# Patient Record
Sex: FEMALE | Race: BLACK OR AFRICAN AMERICAN | NOT HISPANIC OR LATINO | Employment: FULL TIME | URBAN - METROPOLITAN AREA
[De-identification: names, ages, dates, MRNs, and addresses within clinical notes are randomized per-mention and may not be internally consistent; named-entity substitution may affect disease eponyms.]

---

## 2018-08-24 LAB
HUMAN PAPILLOMAVIRUS (HPV): NORMAL
PAP RECOMMENDATION EXT: NORMAL

## 2019-11-25 PROBLEM — N83.209 OVARIAN CYST: Status: ACTIVE | Noted: 2019-11-25

## 2022-01-24 ENCOUNTER — PATIENT MESSAGE (OUTPATIENT)
Dept: ADMINISTRATIVE | Facility: HOSPITAL | Age: 37
End: 2022-01-24
Payer: MEDICAID

## 2022-04-04 ENCOUNTER — PATIENT MESSAGE (OUTPATIENT)
Dept: ADMINISTRATIVE | Facility: HOSPITAL | Age: 37
End: 2022-04-04
Payer: MEDICAID

## 2022-05-04 ENCOUNTER — PATIENT OUTREACH (OUTPATIENT)
Dept: ADMINISTRATIVE | Facility: HOSPITAL | Age: 37
End: 2022-05-04
Payer: MEDICAID

## 2023-04-04 ENCOUNTER — PATIENT MESSAGE (OUTPATIENT)
Dept: RESEARCH | Facility: HOSPITAL | Age: 38
End: 2023-04-04

## 2023-05-12 LAB
HPV APTIMA: NEGATIVE
PAP RECOMMENDATION EXT: NORMAL

## 2023-06-01 PROBLEM — O02.1 MISSED ABORTION: Status: ACTIVE | Noted: 2023-06-01

## 2023-06-05 PROBLEM — R10.31 RIGHT LOWER QUADRANT ABDOMINAL PAIN: Status: ACTIVE | Noted: 2023-06-05

## 2023-09-15 ENCOUNTER — PATIENT OUTREACH (OUTPATIENT)
Dept: ADMINISTRATIVE | Facility: HOSPITAL | Age: 38
End: 2023-09-15
Payer: MEDICAID

## 2024-03-14 PROBLEM — E78.2 MIXED HYPERCHOLESTEROLEMIA AND HYPERTRIGLYCERIDEMIA: Status: ACTIVE | Noted: 2024-03-14

## 2024-03-14 PROBLEM — E53.8 LOW SERUM VITAMIN B12: Status: ACTIVE | Noted: 2024-03-14

## 2024-03-14 PROBLEM — E55.9 VITAMIN D DEFICIENCY: Status: ACTIVE | Noted: 2024-03-14

## 2024-03-14 PROBLEM — R74.8 ELEVATED LIVER ENZYMES: Status: ACTIVE | Noted: 2024-03-14

## 2024-03-14 PROBLEM — E04.9 THYROID ENLARGEMENT: Status: ACTIVE | Noted: 2024-03-14

## 2024-03-14 PROBLEM — R40.0 DAYTIME SOMNOLENCE: Status: ACTIVE | Noted: 2024-03-14

## 2024-03-14 PROBLEM — R73.9 HYPERGLYCEMIA: Status: ACTIVE | Noted: 2024-03-14

## 2024-04-30 ENCOUNTER — PATIENT OUTREACH (OUTPATIENT)
Dept: ADMINISTRATIVE | Facility: OTHER | Age: 39
End: 2024-04-30
Payer: MEDICAID

## 2024-05-01 VITALS — DIASTOLIC BLOOD PRESSURE: 78 MMHG | SYSTOLIC BLOOD PRESSURE: 130 MMHG | HEART RATE: 76 BPM | OXYGEN SATURATION: 97 %

## 2024-05-06 ENCOUNTER — TELEPHONE (OUTPATIENT)
Dept: BARIATRICS | Facility: CLINIC | Age: 39
End: 2024-05-06
Payer: MEDICAID

## 2024-05-06 NOTE — TELEPHONE ENCOUNTER
----- Message from Rhonda Wick sent at 5/6/2024 12:28 PM CDT -----  Regarding: apt  Contact: [phone]  Pt calling in to schedule apt  referral is in system please call to discuss Further

## 2024-05-06 NOTE — TELEPHONE ENCOUNTER
Returned pt call in regard to interest in being scheduled for surgical wl. Pt confirmed she has another insurance besides medicaid ([employer]). Asked pt to update her insurance by calling the insurance verification dept. Pt will call back to be scheduled for a FC phone call after updating her insurance.

## 2024-05-21 ENCOUNTER — TELEPHONE (OUTPATIENT)
Dept: BARIATRICS | Facility: CLINIC | Age: 39
End: 2024-05-21
Payer: COMMERCIAL

## 2024-05-24 ENCOUNTER — TELEPHONE (OUTPATIENT)
Dept: BARIATRICS | Facility: CLINIC | Age: 39
End: 2024-05-24
Payer: COMMERCIAL

## 2025-01-20 DIAGNOSIS — Z36.89 ENCOUNTER FOR FETAL ANATOMIC SURVEY: Primary | ICD-10-CM

## 2025-01-30 ENCOUNTER — LAB VISIT (OUTPATIENT)
Dept: LAB | Facility: OTHER | Age: 40
End: 2025-01-30
Attending: OBSTETRICS & GYNECOLOGY
Payer: COMMERCIAL

## 2025-01-30 ENCOUNTER — OFFICE VISIT (OUTPATIENT)
Dept: MATERNAL FETAL MEDICINE | Facility: CLINIC | Age: 40
End: 2025-01-30
Payer: COMMERCIAL

## 2025-01-30 ENCOUNTER — PROCEDURE VISIT (OUTPATIENT)
Dept: MATERNAL FETAL MEDICINE | Facility: CLINIC | Age: 40
End: 2025-01-30
Payer: COMMERCIAL

## 2025-01-30 VITALS
DIASTOLIC BLOOD PRESSURE: 66 MMHG | BODY MASS INDEX: 40.84 KG/M2 | SYSTOLIC BLOOD PRESSURE: 119 MMHG | WEIGHT: 276.56 LBS

## 2025-01-30 DIAGNOSIS — O34.219 HISTORY OF CESAREAN DELIVERY, CURRENTLY PREGNANT: ICD-10-CM

## 2025-01-30 DIAGNOSIS — O26.22 RECURRENT PREGNANCY LOSS IN PREGNANT PATIENT IN SECOND TRIMESTER, ANTEPARTUM: ICD-10-CM

## 2025-01-30 DIAGNOSIS — F17.210 CIGARETTE NICOTINE DEPENDENCE WITHOUT COMPLICATION: ICD-10-CM

## 2025-01-30 DIAGNOSIS — O09.522 MULTIGRAVIDA OF ADVANCED MATERNAL AGE IN SECOND TRIMESTER: ICD-10-CM

## 2025-01-30 DIAGNOSIS — O26.22 RECURRENT PREGNANCY LOSS IN PREGNANT PATIENT IN SECOND TRIMESTER, ANTEPARTUM: Primary | ICD-10-CM

## 2025-01-30 DIAGNOSIS — Z36.89 ENCOUNTER FOR FETAL ANATOMIC SURVEY: ICD-10-CM

## 2025-01-30 PROCEDURE — 3074F SYST BP LT 130 MM HG: CPT | Mod: CPTII,S$GLB,, | Performed by: OBSTETRICS & GYNECOLOGY

## 2025-01-30 PROCEDURE — 86147 CARDIOLIPIN ANTIBODY EA IG: CPT | Mod: 59 | Performed by: OBSTETRICS & GYNECOLOGY

## 2025-01-30 PROCEDURE — 99204 OFFICE O/P NEW MOD 45 MIN: CPT | Mod: S$GLB,,, | Performed by: OBSTETRICS & GYNECOLOGY

## 2025-01-30 PROCEDURE — 76811 OB US DETAILED SNGL FETUS: CPT | Mod: S$GLB,,, | Performed by: OBSTETRICS & GYNECOLOGY

## 2025-01-30 PROCEDURE — 3078F DIAST BP <80 MM HG: CPT | Mod: CPTII,S$GLB,, | Performed by: OBSTETRICS & GYNECOLOGY

## 2025-01-30 PROCEDURE — 85613 RUSSELL VIPER VENOM DILUTED: CPT | Performed by: OBSTETRICS & GYNECOLOGY

## 2025-01-30 PROCEDURE — 3008F BODY MASS INDEX DOCD: CPT | Mod: CPTII,S$GLB,, | Performed by: OBSTETRICS & GYNECOLOGY

## 2025-01-30 PROCEDURE — 86146 BETA-2 GLYCOPROTEIN ANTIBODY: CPT | Performed by: OBSTETRICS & GYNECOLOGY

## 2025-01-30 PROCEDURE — 1159F MED LIST DOCD IN RCRD: CPT | Mod: CPTII,S$GLB,, | Performed by: OBSTETRICS & GYNECOLOGY

## 2025-01-30 PROCEDURE — 99999 PR PBB SHADOW E&M-EST. PATIENT-LVL III: CPT | Mod: PBBFAC,,, | Performed by: OBSTETRICS & GYNECOLOGY

## 2025-01-30 NOTE — PROGRESS NOTES
MATERNAL-FETAL MEDICINE   CONSULT NOTE    Provider requesting consultation: Sarah    SUBJECTIVE:     Ms. Jimena Vergara is a 39 y.o.  female with IUP at 24w2d who is seen in consultation by [facility] for evaluation and management of:  Problem   Recurrent Pregnancy Loss in Pregnant Patient in Second Trimester, Antepartum   Multigravida of Advanced Maternal Age in Second Trimester   History of  Delivery, Currently Pregnant   Cigarette Nicotine Dependence Without Complication     Patient has no complaints today. She is overall feeling well.  Patient denies any contractions/cramping, vaginal bleeding or leakage of fluid.  She reports good fetal movement.       Medication List with Changes/Refills   Current Medications    ASPIRIN (ECOTRIN) 81 MG EC TABLET    Take 81 mg by mouth once daily.    PRENATAL VIT 10-IRON FUM-FOLIC 65-1 MG TAB    Take 1 capsule by mouth Daily.     Review of patient's allergies indicates:  No Known Allergies    PMH:  Past Medical History:   Diagnosis Date    Digestive disorder     gerd    GERD (gastroesophageal reflux disease)      PObHx:  OB History    Para Term  AB Living   9 3 3   5 3   SAB IAB Ectopic Multiple Live Births   5       3      # Outcome Date GA Lbr Joe/2nd Weight Sex Type Anes PTL Lv   9 Current            8 SAB 2024 10w0d             Complications: History of dilation and curettage   7 SAB 2020 5w0d          6 2019 10w0d             Complications: History of dilation and curettage   5 Term 12/19/15 37w0d   M    BLAIR      Complications: Gestational hypertension   4 Term 14 40w0d   F   N BLAIR      Complications: Breech presentation   3 2012 7w0d          2 Term 10/14/10 37w0d   F CS-LTranv  N BLAIR      Complications: Breech presentation, Gestational hypertension   1 2008 13w0d             Complications: History of dilation and curettage     PSH:  Past Surgical History:   Procedure Laterality Date    APPENDECTOMY       SECTION       DILATION AND CURETTAGE OF UTERUS      DILATION AND CURETTAGE OF UTERUS USING SUCTION N/A 2023    Procedure: DILATION AND CURETTAGE, UTERUS, USING SUCTION;  Surgeon: Kerline Barger MD;  Location: [facility] OR;  Service: OB/GYN;  Laterality: N/A;    OOPHORECTOMY      Right oophorectomy       Family history:family history includes Heart disease in her father and mother; Hypertension in her father and mother.    Social history: reports that she has been smoking cigarettes. She started smoking about 21 years ago. She has a 9.5 pack-year smoking history. She has never used smokeless tobacco. She reports that she does not drink alcohol and does not use drugs.    Genetic history: The patient denies any inherited genetic diseases or birth defects in herself or her partner's personal history or family.    Objective:   /66 (BP Location: Left arm, Patient Position: Sitting)   Wt 125.4 kg (276 lb 9.1 oz)   LMP 2024   BMI 40.84 kg/m²     Physical Exam  deferred    Ultrasound performed. See viewpoint for full ultrasound report.  Vásquez IUP with cardiac activity is identified.   A detailed fetal anatomic ultrasound examination was performed for the following high risk indication: Pregnancy ?36 years of age.   No fetal structural malformations are identified; however, fetal imaging is incomplete today.   Fetal size today is consistent with established gestational age ( g).   Transabdominal cervical length is normal.   Placental location is posterior without evidence of previa.     Significant labs/imaging:  [facility]ris NIPT - low risk  Basic carrier screen - neg    ASSESSMENT/PLAN:     39 y.o.  female with IUP at 24w2d     Recurrent pregnancy loss in pregnant patient in second trimester, antepartum  Please see above for patient's full OB hx. She reports a hx of 5 miscarriage, with three of the (2019, , ) being her last three pregnancies. She did require a D&C for 2 of these. No genetics  were reportedly sent for any of these.  Miscarriage is the most common complication of early pregnancy. An estimated 8-20% of clinically recognized pregnancies less than 20 weeks of gestation will miscarry. 80% of miscarriages happen in the first 12 weeks of gestation. Chromosomal abnormalities account for approximately 50% of all miscarriages and the earlier the gestational age at miscarriage the higher the incidence. Other etiologies for miscarriage include congenital anomalies, maternal uterine anomalies, poorly controlled thyroid disease or diabetes, and acute maternal infection. Some miscarriages are unexplained. Recurrent pregnancy loss (RPL) refers to three or more consecutive losses of clinically recognized pregnancies prior to 20 weeks of gestation. While approximately 15% of women experience 1 miscarriage, only 2% experience 2 consecutive losses and less than 1% experience 3 consecutive losses.   In women with a history of two or more prior losses, the chance of a viable birth after ultrasound  detection of fetal cardiac activity is approximately 77%. ?  After evaluation, recurrent pregnancy loss remains unexplained in approximately one-half of couples. Nevertheless, the chance of a live birth after three unexplained RPLs is over 50 percent with no intervention.   Given that patient is currently 24 weeks pregnant, we will hold off on parental genetics unless needed later. May consider parental karyotypes at a later time if desired.    Recommendations:  Evaluation of the uterine cavity with saline infused sonohysterogram, hysterosalpingogram, hysteroscopy, or 3D transvaginal ultrasound  Evaluation for possible antiphospholipid antibody syndrome with anticardiolipin antibody (IgG and IgM) titer, lupus anticoagulant, and beta-2 glycoprotein IgG and IgM titers - ordered today.  Patient aware that this may not necessarily signify a diagnosis, although further discussion may occur if this returns positive.  An  inherited thrombophilia panel is not recommended for RPL  Evaluation of thyroid function with TSH and for maternal diabetes with 2 hour glucose tolerance test or hemoglobin A1C  (Should be ordered and arranged by the patient's primary OB provider).  Otherwise, routine prenatal care.  No specific recommendations for prenatal testing.  Mode and timing of delivery per routine OB indications.        Multigravida of advanced maternal age in second trimester  Advanced Maternal Age (second trimester)  Today I counseled the patient on the relationship between maternal age and fetal aneuploidy.  We discussed the risks and benefits of screening tests versus definitive genetic testing (amniocentesis). She was counseled about her specific age-related risk of fetal aneuploidy. We discussed the limitations of ultrasound in the definitive diagnosis of fetal aneuploidy.  I quoted the patient a 1 in 900 procedure related risk of fetal loss with genetic amniocentesis.  We also discussed cell free fetal DNA screening including the sensitivity and specificity of the test. Her testing resulted as low risk.  Additionally, we discussed the association between advanced maternal age (AMA) and preeclampsia, gestational diabetes, and fetal growth restriction.    Recommendations:  Follow-up fetal ultrasound for interval fetal growth/completion anatomy - this will be scheduled at [place]  Continue low dose aspirin at 12-16 weeks for preeclampsia risk reduction        History of  delivery, currently pregnant  X 3.   Patient to deliver via .  Management per primary OB provider      Cigarette nicotine dependence without complication  Patient reports having quit recently. Prior to pregnancy she was smoking 1ppd. Most recently was smoking 2-3 cigs every few days.  Continue to encourage patient to remain a nonsmoker.  Management per primary OB provider      Patient was counseled that prenatal ultrasound studies have limitations.  They do not detect all fetal, genetic, placental, and maternal abnormalities. A normal appearing prenatal ultrasound is reassuring. However, it does not guarantee the absence of an abnormality or predict a normal outcome for the fetus or the mother.     FOLLOW UP:   A follow up ultrasound will be made for 5-6 weeks from today with a follow up MFM MD visit.    The patient was given an opportunity to ask questions about the management of her high risk pregnancy problems. She expressed an understanding of and agreement to the above impression and plan. All questions were answered to her satisfaction.    45 minutes of total time spent on the encounter, which includes face to face time and non-face to face time preparing to see the patient (eg, review of tests), obtaining and/or reviewing separately obtained history, documenting clinical information in the electronic or other health record, independently interpreting results (not separately reported) and communicating results to the patient/family/caregiver, or care coordination (not separately reported).        Rick Victoria MD   Maternal-Fetal Medicine      Electronically Signed by Rick Victoria January 30, 2025

## 2025-01-31 PROBLEM — O09.522 MULTIGRAVIDA OF ADVANCED MATERNAL AGE IN SECOND TRIMESTER: Status: ACTIVE | Noted: 2025-01-31

## 2025-01-31 PROBLEM — O26.22 RECURRENT PREGNANCY LOSS IN PREGNANT PATIENT IN SECOND TRIMESTER, ANTEPARTUM: Status: ACTIVE | Noted: 2025-01-31

## 2025-01-31 PROBLEM — O34.219 HISTORY OF CESAREAN DELIVERY, CURRENTLY PREGNANT: Status: ACTIVE | Noted: 2025-01-31

## 2025-02-01 NOTE — ASSESSMENT & PLAN NOTE
Please see above for patient's full OB hx. She reports a hx of 5 miscarriage, with three of the (2019, 2020, 2023) being her last three pregnancies. She did require a D&C for 2 of these. No genetics were reportedly sent for any of these.  Miscarriage is the most common complication of early pregnancy. An estimated 8-20% of clinically recognized pregnancies less than 20 weeks of gestation will miscarry. 80% of miscarriages happen in the first 12 weeks of gestation. Chromosomal abnormalities account for approximately 50% of all miscarriages and the earlier the gestational age at miscarriage the higher the incidence. Other etiologies for miscarriage include congenital anomalies, maternal uterine anomalies, poorly controlled thyroid disease or diabetes, and acute maternal infection. Some miscarriages are unexplained. Recurrent pregnancy loss (RPL) refers to three or more consecutive losses of clinically recognized pregnancies prior to 20 weeks of gestation. While approximately 15% of women experience 1 miscarriage, only 2% experience 2 consecutive losses and less than 1% experience 3 consecutive losses.   In women with a history of two or more prior losses, the chance of a viable birth after ultrasound  detection of fetal cardiac activity is approximately 77%. ?  After evaluation, recurrent pregnancy loss remains unexplained in approximately one-half of couples. Nevertheless, the chance of a live birth after three unexplained RPLs is over 50 percent with no intervention.   Given that patient is currently 24 weeks pregnant, we will hold off on parental genetics unless needed later. May consider parental karyotypes at a later time if desired.    Recommendations:  Evaluation of the uterine cavity with saline infused sonohysterogram, hysterosalpingogram, hysteroscopy, or 3D transvaginal ultrasound  Evaluation for possible antiphospholipid antibody syndrome with anticardiolipin antibody (IgG and IgM) titer, lupus  anticoagulant, and beta-2 glycoprotein IgG and IgM titers - ordered today.  Patient aware that this may not necessarily signify a diagnosis, although further discussion may occur if this returns positive.  An inherited thrombophilia panel is not recommended for RPL  Evaluation of thyroid function with TSH and for maternal diabetes with 2 hour glucose tolerance test or hemoglobin A1C  (Should be ordered and arranged by the patient's primary OB provider).  Otherwise, routine prenatal care.  No specific recommendations for prenatal testing.  Mode and timing of delivery per routine OB indications.

## 2025-02-01 NOTE — ASSESSMENT & PLAN NOTE
Patient reports having quit recently. Prior to pregnancy she was smoking 1ppd. Most recently was smoking 2-3 cigs every few days.  Continue to encourage patient to remain a nonsmoker.  Management per primary OB provider

## 2025-02-01 NOTE — ASSESSMENT & PLAN NOTE
Advanced Maternal Age (second trimester)  Today I counseled the patient on the relationship between maternal age and fetal aneuploidy.  We discussed the risks and benefits of screening tests versus definitive genetic testing (amniocentesis). She was counseled about her specific age-related risk of fetal aneuploidy. We discussed the limitations of ultrasound in the definitive diagnosis of fetal aneuploidy.  I quoted the patient a 1 in 900 procedure related risk of fetal loss with genetic amniocentesis.  We also discussed cell free fetal DNA screening including the sensitivity and specificity of the test. Her testing resulted as low risk.  Additionally, we discussed the association between advanced maternal age (AMA) and preeclampsia, gestational diabetes, and fetal growth restriction.    Recommendations:  Follow-up fetal ultrasound for interval fetal growth/completion anatomy - this will be scheduled at [place]  Continue low dose aspirin at 12-16 weeks for preeclampsia risk reduction

## 2025-02-03 LAB
CONFIRM DRVVT STA-STACLOT: NORMAL S
DRVVT SCREEN TO CONFIRM RATIO: NORMAL {RATIO}
HEPARIN NT PPP QL: NORMAL
LA 3 SCREEN W REFLEX-IMP: NORMAL
LMW HEPARIN IND PLT AB SER QL: NORMAL
MIXING DRVVT/NORMAL: NORMAL %
NEUTRALIZED DRVVT SCREEN RATIO: NORMAL
PROTHROMBIN TIME: 12.5 S (ref 12–15.5)
SCREEN APTT/NORMAL: 0.88
SCREEN APTT/NORMAL: NORMAL
SCREEN DRVVT/NORMAL: 1.04 %
THROMBIN TIME: NORMAL S

## 2025-02-04 LAB
B2 GLYCOPROT1 IGA SER QL: 1.8 U/ML
B2 GLYCOPROT1 IGG SER QL: <0.8 U/ML
B2 GLYCOPROT1 IGM SER QL: <2.4 U/ML
CARDIOLIPIN IGG SER IA-ACNC: <9.4 GPL (ref 0–14.99)
CARDIOLIPIN IGM SER IA-ACNC: <9.4 MPL (ref 0–12.49)

## 2025-02-07 ENCOUNTER — PATIENT MESSAGE (OUTPATIENT)
Dept: MATERNAL FETAL MEDICINE | Facility: CLINIC | Age: 40
End: 2025-02-07
Payer: COMMERCIAL